# Patient Record
Sex: FEMALE | Race: WHITE | Employment: UNEMPLOYED | URBAN - METROPOLITAN AREA
[De-identification: names, ages, dates, MRNs, and addresses within clinical notes are randomized per-mention and may not be internally consistent; named-entity substitution may affect disease eponyms.]

---

## 2022-04-22 ENCOUNTER — TELEPHONE (OUTPATIENT)
Dept: OTOLARYNGOLOGY | Facility: CLINIC | Age: 77
End: 2022-04-22

## 2022-04-22 ENCOUNTER — OFFICE VISIT (OUTPATIENT)
Dept: OTOLARYNGOLOGY | Facility: CLINIC | Age: 77
End: 2022-04-22
Payer: MEDICARE

## 2022-04-22 VITALS — HEIGHT: 60 IN | BODY MASS INDEX: 31.41 KG/M2 | WEIGHT: 160 LBS

## 2022-04-22 DIAGNOSIS — K14.6 BURNING MOUTH SYNDROME: Primary | ICD-10-CM

## 2022-04-22 RX ORDER — CLOTRIMAZOLE 10 MG/1
LOZENGE ORAL; TOPICAL
COMMUNITY
Start: 2021-09-14

## 2022-04-22 RX ORDER — MELOXICAM 7.5 MG/1
TABLET ORAL
COMMUNITY
Start: 2022-04-03

## 2022-04-22 RX ORDER — PREDNISONE 10 MG/1
TABLET ORAL
Qty: 44 TABLET | Refills: 0 | Status: SHIPPED | OUTPATIENT
Start: 2022-04-22

## 2022-04-22 RX ORDER — DIPHENHYDRAMINE HCL 12.5MG/5ML
LIQUID (ML) ORAL
Qty: 500 ML | Refills: 0 | Status: SHIPPED | OUTPATIENT
Start: 2022-04-22

## 2022-04-23 NOTE — TELEPHONE ENCOUNTER
Rn spoke to Gauri Villa and order clarified for Hydrocortisone 20 mg mix in Benadryl /elixir. Ezequiel Beth

## 2022-05-20 ENCOUNTER — OFFICE VISIT (OUTPATIENT)
Dept: OTOLARYNGOLOGY | Facility: CLINIC | Age: 77
End: 2022-05-20
Payer: MEDICARE

## 2022-05-20 VITALS — WEIGHT: 160 LBS | BODY MASS INDEX: 31.41 KG/M2 | HEIGHT: 60 IN

## 2022-05-20 DIAGNOSIS — K14.6 BURNING MOUTH SYNDROME: Primary | ICD-10-CM

## 2022-05-20 RX ORDER — HYDROCORTISONE 20 MG/1
20 TABLET ORAL DAILY
Qty: 8 TABLET | Refills: 0 | Status: SHIPPED | OUTPATIENT
Start: 2022-05-20

## 2022-05-23 ENCOUNTER — TELEPHONE (OUTPATIENT)
Dept: OTOLARYNGOLOGY | Facility: CLINIC | Age: 77
End: 2022-05-23

## 2022-05-23 NOTE — TELEPHONE ENCOUNTER
Pt states was supposed to take the following medication  diphenhydrAMINE 12.5 MG/5ML Oral Elixir 500 mL       not this medication hydrocortisone 20 MG Oral asking what is the correct medication please advise

## 2023-08-15 ENCOUNTER — TELEPHONE (OUTPATIENT)
Dept: OTOLARYNGOLOGY | Facility: CLINIC | Age: 78
End: 2023-08-15

## 2023-08-15 RX ORDER — CLOTRIMAZOLE 10 MG/1
LOZENGE ORAL; TOPICAL
Qty: 150 TROCHE | Refills: 0 | Status: SHIPPED | OUTPATIENT
Start: 2023-08-15

## 2023-08-15 NOTE — TELEPHONE ENCOUNTER
Refill was sent today    clotrimazole 10 MG Mouth/Throat Ruthy 150 Ruthy 0 8/15/2023     Sig: clotrimazole 10 mg ruthy Take 1 tablet(s) 5 times a day by oral route.     Sent to pharmacy as: Clotrimazole 10 MG Mouth/Throat Ruthy (Mycelex)    E-Prescribing Status: Receipt confirmed by pharmacy (8/15/2023 12:02 PM CDT)

## 2023-08-15 NOTE — TELEPHONE ENCOUNTER
clotrimazole 10 MG Mouth/Throat Ruthy, clotrimazole 10 mg ruthy  Take 1 tablet(s) 5 times a day by oral route.  (Patient not taking: No sig reported), Disp: , Rfl:    Pt is requesting a refill and spoke with dr Hadley Solis about this medication

## 2023-09-21 ENCOUNTER — TELEPHONE (OUTPATIENT)
Dept: OTOLARYNGOLOGY | Facility: CLINIC | Age: 78
End: 2023-09-21

## 2023-09-21 RX ORDER — HYDROCORTISONE 20 MG/1
20 TABLET ORAL DAILY
Qty: 8 TABLET | Refills: 2 | Status: SHIPPED | OUTPATIENT
Start: 2023-09-21

## 2024-04-18 ENCOUNTER — OFFICE VISIT (OUTPATIENT)
Dept: OTOLARYNGOLOGY | Facility: CLINIC | Age: 79
End: 2024-04-18

## 2024-04-18 DIAGNOSIS — R68.84 JAW PAIN: Primary | ICD-10-CM

## 2024-04-18 PROCEDURE — 99214 OFFICE O/P EST MOD 30 MIN: CPT | Performed by: OTOLARYNGOLOGY

## 2024-04-18 RX ORDER — CYCLOBENZAPRINE HCL 5 MG
5 TABLET ORAL NIGHTLY
Qty: 30 TABLET | Refills: 1 | Status: SHIPPED | OUTPATIENT
Start: 2024-04-18

## 2024-04-18 NOTE — PROGRESS NOTES
Pema Ruvalcaba is a 79 year old female.    Chief Complaint   Patient presents with    Follow - Up     Patient is here due to discomfort in mouth  follow up. Reports improvement in symptoms.        HISTORY OF PRESENT ILLNESS  She presents with 3-year history of gum sores.  Seen by her dentist with multiple lesions noted throughout her gingiva and buccal mucosa.  She states about a year ago she started developing burning tongue syndrome.  No help from anything so far.  She has been on B vitamins without any improvement in her symptoms.  Seen by dentist and given no medications referred to me for further evaluation and treatment.  States that when she eats certain types of food it hurts badly and seems to be worse when she moves her tongue in certain directions.  Seems like there is a mechanical cause of her pain in most situations.  She is able to drink water and soup without discomfort.  Despite every other food causes discomfort.     5/20/22 last visit she was started on prednisone burst and taper as well as Benadryl/hydrocortisone elixir and she is a complete resolution of her 3-year history of mouth ulcers and her 1 year history of burning tongue.  No complaints or concerns very happy with the results of her treatment     4/18/24 she presents with pain in the mouth along the upper teeth going into the jaw and behind the ear.  Feels discomfort.  Was diagnosed with TMJ in the past offered a bite guard that she could only use for a short period of time many years ago.  Brushing her teeth with electric toothbrush causes her discomfort.  Certain foods when she chews to much causes her discomfort as well.  Has a history of osteoarthritis.  Has some cervical spine issues as well.  Currently on meloxicam daily but has been told by her regular doctor to wean herself off of this but has not been able to.  Too much pain if she does not use it.      Social History     Socioeconomic History    Marital status:    Tobacco  Use    Smoking status: Former     Current packs/day: 0.00     Types: Cigarettes     Quit date: 1980     Years since quittin.3    Smokeless tobacco: Never   Vaping Use    Vaping status: Never Used   Substance and Sexual Activity    Alcohol use: Yes     Alcohol/week: 1.0 standard drink of alcohol     Types: 1 Glasses of wine per week     Comment: nightly    Drug use: Never       History reviewed. No pertinent family history.    History reviewed. No pertinent past medical history.    History reviewed. No pertinent surgical history.      REVIEW OF SYSTEMS    System Neg/Pos Details   Constitutional Negative Fatigue, fever and weight loss.   ENMT Negative Drooling.   Eyes Negative Blurred vision and vision changes.   Respiratory Negative Dyspnea and wheezing.   Cardio Negative Chest pain, irregular heartbeat/palpitations and syncope.   GI Negative Abdominal pain and diarrhea.   Endocrine Negative Cold intolerance and heat intolerance.   Neuro Negative Tremors.   Psych Negative Anxiety and depression.   Integumentary Negative Frequent skin infections, pigment change and rash.   Hema/Lymph Negative Easy bleeding and easy bruising.           PHYSICAL EXAM    There were no vitals taken for this visit.       Constitutional Normal Overall appearance - Normal.   Psychiatric Normal Orientation - Oriented to time, place, person & situation. Appropriate mood and affect.   Neck Exam Normal Inspection - Normal. Palpation - Normal. Parotid gland - Normal. Thyroid gland - Normal.   Eyes Normal Conjunctiva - Right: Normal, Left: Normal. Pupil - Right: Normal, Left: Normal. Fundus - Right: Normal, Left: Normal.   Neurological Normal Memory - Normal. Cranial nerves - Cranial nerves II through XII grossly intact.   Head/Face Normal Facial features - Normal. Eyebrows - Normal. Skull - Normal.        Nasopharynx Normal External nose - Normal. Lips/teeth/gums - Normal. Tonsils - Normal. Oropharynx - Normal.   Ears Normal Inspection -  Right: Normal, Left: Normal. Canal - Right: Normal, Left: Normal. TM - Right: Normal, Left: Normal.   Skin Normal Inspection - Normal.        Lymph Detail Normal Submental. Submandibular. Anterior cervical. Posterior cervical. Supraclavicular.   TMJ  Tender to palpation bilaterally   Nose/Mouth/Throat Normal External nose - Normal. Lips/teeth/gums - Normal. Tonsils - Normal. Oropharynx - Normal.   Nose/Mouth/Throat Normal Nares - Right: Normal Left: Normal. Septum -Normal  Turbinates - Right: Normal, Left: Normal.       Current Outpatient Medications:     cyclobenzaprine 5 MG Oral Tab, Take 1 tablet (5 mg total) by mouth nightly., Disp: 30 tablet, Rfl: 1    hydrocortisone 20 MG Oral Tab, Take 1 tablet (20 mg total) by mouth daily., Disp: 8 tablet, Rfl: 2    clotrimazole 10 MG Mouth/Throat Ruthy, clotrimazole 10 mg ruthy Take 1 tablet(s) 5 times a day by oral route., Disp: 150 Ruthy, Rfl: 0    hydrocortisone 20 MG Oral Tab, Take 1 tablet (20 mg total) by mouth daily., Disp: 8 tablet, Rfl: 0    Cranberry-Vitamin C-Vitamin E 4200-20-3 MG-MG-UNIT Oral Cap, cranberry, Disp: , Rfl:     Meloxicam 7.5 MG Oral Tab, , Disp: , Rfl:     diclofenac 1 % External Gel, Apply topically As Directed., Disp: , Rfl:     predniSONE 10 MG Oral Tab, 6 tablets daily day one through 5, 4 tablets daily on days  6 and 7, 2 tablets daily on days 8 and 9, One tablet daily on days 10 and 11., Disp: 44 tablet, Rfl: 0    diphenhydrAMINE 12.5 MG/5ML Oral Elixir, Please crush 8 tablets of hydrocortisone 20 mg and mix in 500 cc of Benadryl elixir. 5 cc swish and swallow q.6 hours, Disp: 500 mL, Rfl: 0  ASSESSMENT AND PLAN    1. Jaw pain  Appears to be musculoskeletal.  Doing very well with her burning mouth issues.  Start cyclobenzaprine continue with meloxicam warm heat soft diet use Voltaren gel externally along the jaw and return to see me in 1 month.  She may consider talking to her dentist about getting a bite guard.  Greater than 30 minutes  spent with patient discussions regarding her multiple issues.        This note was prepared using Dragon [employer] voice recognition dictation software. As a result errors may occur. When identified these errors have been corrected. While every attempt is made to correct errors during dictation discrepancies may still exist    Filipe Chavez MD    4/18/2024    11:40 AM

## 2024-07-19 ENCOUNTER — OFFICE VISIT (OUTPATIENT)
Dept: OTOLARYNGOLOGY | Facility: CLINIC | Age: 79
End: 2024-07-19

## 2024-07-19 VITALS — BODY MASS INDEX: 31.85 KG/M2 | WEIGHT: 158 LBS | HEIGHT: 59 IN

## 2024-07-19 DIAGNOSIS — K13.79 MOUTH PAIN: Primary | ICD-10-CM

## 2024-07-19 PROCEDURE — 99213 OFFICE O/P EST LOW 20 MIN: CPT | Performed by: OTOLARYNGOLOGY

## 2024-07-19 RX ORDER — PREDNISONE 10 MG/1
TABLET ORAL
Qty: 44 TABLET | Refills: 0 | Status: SHIPPED | OUTPATIENT
Start: 2024-07-19

## 2024-07-19 RX ORDER — ZOSTER VACCINE RECOMBINANT, ADJUVANTED 50 MCG/0.5
KIT INTRAMUSCULAR
COMMUNITY
Start: 2023-12-04

## 2024-07-19 RX ORDER — FAMOTIDINE 20 MG/1
1 TABLET, FILM COATED ORAL AS NEEDED
COMMUNITY

## 2024-07-19 RX ORDER — CETIRIZINE HYDROCHLORIDE 10 MG/1
10 TABLET ORAL DAILY
COMMUNITY

## 2024-07-19 RX ORDER — HYDROCORTISONE 20 MG/1
TABLET ORAL
Qty: 8 TABLET | Refills: 0 | Status: SHIPPED | OUTPATIENT
Start: 2024-07-19

## 2024-07-19 RX ORDER — MELATONIN
1000 DAILY
COMMUNITY

## 2024-07-19 RX ORDER — VITAMIN E 268 MG
400 CAPSULE ORAL DAILY
COMMUNITY

## 2024-07-19 RX ORDER — CYCLOSPORINE 0.5 MG/ML
1 EMULSION OPHTHALMIC EVERY 12 HOURS
COMMUNITY
Start: 2024-03-27

## 2024-07-19 RX ORDER — DIPHENHYDRAMINE HCL 12.5MG/5ML
LIQUID (ML) ORAL
Qty: 500 ML | Refills: 0 | Status: SHIPPED | OUTPATIENT
Start: 2024-07-19

## 2024-07-20 NOTE — PROGRESS NOTES
Pema Ruvalcaba is a 79 year old female.    Chief Complaint   Patient presents with    Burning Tongue     Patient reports burning tongue sensation.       HISTORY OF PRESENT ILLNESS  She presents with 3-year history of gum sores.  Seen by her dentist with multiple lesions noted throughout her gingiva and buccal mucosa.  She states about a year ago she started developing burning tongue syndrome.  No help from anything so far.  She has been on B vitamins without any improvement in her symptoms.  Seen by dentist and given no medications referred to me for further evaluation and treatment.  States that when she eats certain types of food it hurts badly and seems to be worse when she moves her tongue in certain directions.  Seems like there is a mechanical cause of her pain in most situations.  She is able to drink water and soup without discomfort.  Despite every other food causes discomfort.     5/20/22 last visit she was started on prednisone burst and taper as well as Benadryl/hydrocortisone elixir and she is a complete resolution of her 3-year history of mouth ulcers and her 1 year history of burning tongue.  No complaints or concerns very happy with the results of her treatment     4/18/24 she presents with pain in the mouth along the upper teeth going into the jaw and behind the ear.  Feels discomfort.  Was diagnosed with TMJ in the past offered a bite guard that she could only use for a short period of time many years ago.  Brushing her teeth with electric toothbrush causes her discomfort.  Certain foods when she chews to much causes her discomfort as well.  Has a history of osteoarthritis.  Has some cervical spine issues as well.  Currently on meloxicam daily but has been told by her regular doctor to wean herself off of this but has not been able to.  Too much pain if she does not use it.     7/19/24 She has been having oral pain and tongue discomfort with oral ulcers once again for the past few weeks. Pain was  unbearable last week. Could not even eat.  Had this earlier this year and it resolved with steroids.  Etiology unclear.       Social History     Socioeconomic History    Marital status:    Tobacco Use    Smoking status: Former     Current packs/day: 0.00     Types: Cigarettes     Quit date: 1980     Years since quittin.5    Smokeless tobacco: Never   Vaping Use    Vaping status: Never Used   Substance and Sexual Activity    Alcohol use: Yes     Alcohol/week: 1.0 standard drink of alcohol     Types: 1 Glasses of wine per week     Comment: nightly    Drug use: Never       History reviewed. No pertinent family history.    History reviewed. No pertinent past medical history.    History reviewed. No pertinent surgical history.      REVIEW OF SYSTEMS    System Neg/Pos Details   Constitutional Negative Fatigue, fever and weight loss.   ENMT Negative Drooling.   Eyes Negative Blurred vision and vision changes.   Respiratory Negative Dyspnea and wheezing.   Cardio Negative Chest pain, irregular heartbeat/palpitations and syncope.   GI Negative Abdominal pain and diarrhea.   Endocrine Negative Cold intolerance and heat intolerance.   Neuro Negative Tremors.   Psych Negative Anxiety and depression.   Integumentary Negative Frequent skin infections, pigment change and rash.   Hema/Lymph Negative Easy bleeding and easy bruising.           PHYSICAL EXAM    Ht 4' 11\" (1.499 m)   Wt 158 lb (71.7 kg)   BMI 31.91 kg/m²        Constitutional Normal Overall appearance - Normal.   Psychiatric Normal Orientation - Oriented to time, place, person & situation. Appropriate mood and affect.   Neck Exam Normal Inspection - Normal. Palpation - Normal. Parotid gland - Normal. Thyroid gland - Normal.   Eyes Normal Conjunctiva - Right: Normal, Left: Normal. Pupil - Right: Normal, Left: Normal. Fundus - Right: Normal, Left: Normal.   Neurological Normal Memory - Normal. Cranial nerves - Cranial nerves II through XII grossly  intact.   Head/Face Normal Facial features - Normal. Eyebrows - Normal. Skull - Normal.        Nasopharynx Normal External nose - Normal. Lips/teeth/gums - Normal. Tonsils - Normal. Oropharynx - Normal.   Ears Normal Inspection - Right: Normal, Left: Normal. Canal - Right: Normal, Left: Normal. TM - Right: Normal, Left: Normal.   Skin Normal Inspection - Normal.        Lymph Detail Normal Submental. Submandibular. Anterior cervical. Posterior cervical. Supraclavicular.        Nose/Mouth/Throat Normal External nose - Normal. Lips/teeth/gums - Normal. Tonsils - Normal. Oropharynx - Normal. No tongue lesions. Left buccal mucosal excoriation.    Nose/Mouth/Throat Normal Nares - Right: Normal Left: Normal. Septum -Normal  Turbinates - Right: Normal, Left: Normal.       Current Outpatient Medications:     cetirizine 10 MG Oral Tab, Take 1 tablet (10 mg total) by mouth daily., Disp: , Rfl:     cholecalciferol 125 MCG (5000 UT) Oral Tab, Take 1 tablet (125 mcg total) by mouth daily., Disp: , Rfl:     cyanocobalamin 1000 MCG Oral Tab, Take 1 tablet (1,000 mcg total) by mouth daily., Disp: , Rfl:     cycloSPORINE 0.05 % Ophthalmic Emulsion, Place 1 drop into both eyes Q12H., Disp: , Rfl:     famotidine 20 MG Oral Tab, Take 1 tablet (20 mg total) by mouth as needed., Disp: , Rfl:     Vitamin E (VITAMIN E/D-ALPHA NATURAL) 268 MG (400 UNIT) Oral Cap, Take 400 Units by mouth daily., Disp: , Rfl:     Zoster Vac Recomb Adjuvanted (SHINGRIX) 50 MCG/0.5ML Intramuscular Recon Susp, Inject 0.5 mLs into the muscle once for 1 dose. Repeat in 2 to 6 months., Disp: , Rfl:     predniSONE 10 MG Oral Tab, 6 tablets daily day one through 5, 4 tablets daily on days  6 and 7, 2 tablets daily on days 8 and 9, One tablet daily on days 10 and 11., Disp: 44 tablet, Rfl: 0    diphenhydrAMINE 12.5 MG/5ML Oral Elixir, Please crush 8 tablets of hydrocortisone 20 mg and mix in 500 cc of Benadryl elixir. 5 cc swish and swallow q.6 hours, Disp: 500 mL,  Rfl: 0    hydrocortisone 20 MG Oral Tab, Please crush 8 tablets of hydrocortisone 20 mg and mix in 500 cc of Benadryl elixir. 5 cc swish and swallow q.6 hours, Disp: 8 tablet, Rfl: 0    hydrocortisone 20 MG Oral Tab, Take 1 tablet (20 mg total) by mouth daily., Disp: 8 tablet, Rfl: 2    clotrimazole 10 MG Mouth/Throat Ruthy, clotrimazole 10 mg ruthy Take 1 tablet(s) 5 times a day by oral route., Disp: 150 Ruthy, Rfl: 0    hydrocortisone 20 MG Oral Tab, Take 1 tablet (20 mg total) by mouth daily., Disp: 8 tablet, Rfl: 0    Cranberry-Vitamin C-Vitamin E 4200-20-3 MG-MG-UNIT Oral Cap, cranberry, Disp: , Rfl:     Meloxicam 7.5 MG Oral Tab, , Disp: , Rfl:     diclofenac 1 % External Gel, Apply topically As Directed., Disp: , Rfl:     diphenhydrAMINE 12.5 MG/5ML Oral Elixir, Please crush 8 tablets of hydrocortisone 20 mg and mix in 500 cc of Benadryl elixir. 5 cc swish and swallow q.6 hours, Disp: 500 mL, Rfl: 0    cyclobenzaprine 5 MG Oral Tab, Take 1 tablet (5 mg total) by mouth nightly. (Patient not taking: Reported on 7/19/2024), Disp: 30 tablet, Rfl: 1    predniSONE 10 MG Oral Tab, 6 tablets daily day one through 5, 4 tablets daily on days  6 and 7, 2 tablets daily on days 8 and 9, One tablet daily on days 10 and 11. (Patient not taking: Reported on 7/19/2024), Disp: 44 tablet, Rfl: 0  ASSESSMENT AND PLAN    1. Mouth pain  Once again with oral cavity and tongue pain. Etiology unclear.  Resume mouthwash containing Hydrocortisone and Benadryl as well as prednisone burst and taper.         This note was prepared using Dragon [employer] voice recognition dictation software. As a result errors may occur. When identified these errors have been corrected. While every attempt is made to correct errors during dictation discrepancies may still exist    Filipe Chavez MD    7/19/2024    9:48 PM

## 2024-07-23 ENCOUNTER — TELEPHONE (OUTPATIENT)
Dept: OTOLARYNGOLOGY | Facility: CLINIC | Age: 79
End: 2024-07-23

## 2024-07-23 NOTE — TELEPHONE ENCOUNTER
Third party reject information  Medication not covered by insurance [place] required for   DIPHENHYDRAMINE HCI 12.5 ML elxphar take 5 ml and swish and swallow every 6 hours  Quant-500.0  Days supply 25

## 2024-08-16 ENCOUNTER — OFFICE VISIT (OUTPATIENT)
Dept: AUDIOLOGY | Facility: CLINIC | Age: 79
End: 2024-08-16

## 2024-08-16 ENCOUNTER — OFFICE VISIT (OUTPATIENT)
Dept: OTOLARYNGOLOGY | Facility: CLINIC | Age: 79
End: 2024-08-16

## 2024-08-16 DIAGNOSIS — H91.90 HEARING LOSS, UNSPECIFIED HEARING LOSS TYPE, UNSPECIFIED LATERALITY: Primary | ICD-10-CM

## 2024-08-16 DIAGNOSIS — H91.91 DECREASED HEARING OF RIGHT EAR: Primary | ICD-10-CM

## 2024-08-16 DIAGNOSIS — K14.6 TONGUE PAIN: ICD-10-CM

## 2024-08-16 DIAGNOSIS — H90.3 SENSORINEURAL HEARING LOSS, BILATERAL: ICD-10-CM

## 2024-08-16 PROCEDURE — 92557 COMPREHENSIVE HEARING TEST: CPT | Performed by: AUDIOLOGIST

## 2024-08-16 PROCEDURE — 92567 TYMPANOMETRY: CPT | Performed by: AUDIOLOGIST

## 2024-08-16 PROCEDURE — 99213 OFFICE O/P EST LOW 20 MIN: CPT | Performed by: OTOLARYNGOLOGY

## 2024-08-16 RX ORDER — GABAPENTIN 100 MG/1
100 CAPSULE ORAL 3 TIMES DAILY
Qty: 90 CAPSULE | Refills: 1 | Status: SHIPPED | OUTPATIENT
Start: 2024-08-16

## 2024-08-16 NOTE — PROGRESS NOTES
Pema Ruvalcaba is a 79 year old female.    Chief Complaint   Patient presents with    Hearing Loss     Patient is here due to not being able to hear well from left ear.     Mouth Injury     Patient is here due to burning sensation on tongue        HISTORY OF PRESENT ILLNESS  She presents with 3-year history of gum sores.  Seen by her dentist with multiple lesions noted throughout her gingiva and buccal mucosa.  She states about a year ago she started developing burning tongue syndrome.  No help from anything so far.  She has been on B vitamins without any improvement in her symptoms.  Seen by dentist and given no medications referred to me for further evaluation and treatment.  States that when she eats certain types of food it hurts badly and seems to be worse when she moves her tongue in certain directions.  Seems like there is a mechanical cause of her pain in most situations.  She is able to drink water and soup without discomfort.  Despite every other food causes discomfort.     5/20/22 last visit she was started on prednisone burst and taper as well as Benadryl/hydrocortisone elixir and she is a complete resolution of her 3-year history of mouth ulcers and her 1 year history of burning tongue.  No complaints or concerns very happy with the results of her treatment     4/18/24 she presents with pain in the mouth along the upper teeth going into the jaw and behind the ear.  Feels discomfort.  Was diagnosed with TMJ in the past offered a bite guard that she could only use for a short period of time many years ago.  Brushing her teeth with electric toothbrush causes her discomfort.  Certain foods when she chews to much causes her discomfort as well.  Has a history of osteoarthritis.  Has some cervical spine issues as well.  Currently on meloxicam daily but has been told by her regular doctor to wean herself off of this but has not been able to.  Too much pain if she does not use it.     7/19/24 She has been having  oral pain and tongue discomfort with oral ulcers once again for the past few weeks. Pain was unbearable last week. Could not even eat.  Had this earlier this year and it resolved with steroids.  Etiology unclear.      24 last visit she developed sudden onset of illness with significant cough.  Within 2 weeks she started noticing the onset of some changes in her hearing on the left side.  Was given some eardrops but now notes an echoing sensation since the beginning of August.  No other signs, symptoms or complaints.  Feels that her ear has slightly improved.  Still continues to have tongue discomfort but interestingly only seem to happen with tongue movement by talking or chewing food.  The food itself does not seem to bother her as much of the movement.  Neuralgia?  Never took the prednisone or hydrocortisone with Benadryl that was prescribed at her last visit.  Audiogram was performed today demonstrating significant mild downsloping to severe sensory hearing loss bilaterally with an asymmetry on the left with a speech discrimination scores of about 60% on the left and 100% on the right.      Social History     Socioeconomic History    Marital status:    Tobacco Use    Smoking status: Former     Current packs/day: 0.00     Types: Cigarettes     Quit date: 1980     Years since quittin.6    Smokeless tobacco: Never   Vaping Use    Vaping status: Never Used   Substance and Sexual Activity    Alcohol use: Yes     Alcohol/week: 1.0 standard drink of alcohol     Types: 1 Glasses of wine per week     Comment: nightly    Drug use: Never       History reviewed. No pertinent family history.    History reviewed. No pertinent past medical history.    History reviewed. No pertinent surgical history.      REVIEW OF SYSTEMS    System Neg/Pos Details   Constitutional Negative Fatigue, fever and weight loss.   ENMT Negative Drooling.   Eyes Negative Blurred vision and vision changes.   Respiratory Negative  Dyspnea and wheezing.   Cardio Negative Chest pain, irregular heartbeat/palpitations and syncope.   GI Negative Abdominal pain and diarrhea.   Endocrine Negative Cold intolerance and heat intolerance.   Neuro Negative Tremors.   Psych Negative Anxiety and depression.   Integumentary Negative Frequent skin infections, pigment change and rash.   Hema/Lymph Negative Easy bleeding and easy bruising.           PHYSICAL EXAM    There were no vitals taken for this visit.       Constitutional Normal Overall appearance - Normal.   Psychiatric Normal Orientation - Oriented to time, place, person & situation. Appropriate mood and affect.   Neck Exam Normal Inspection - Normal. Palpation - Normal. Parotid gland - Normal. Thyroid gland - Normal.   Eyes Normal Conjunctiva - Right: Normal, Left: Normal. Pupil - Right: Normal, Left: Normal. Fundus - Right: Normal, Left: Normal.   Neurological Normal Memory - Normal. Cranial nerves - Cranial nerves II through XII grossly intact.   Head/Face Normal Facial features - Normal. Eyebrows - Normal. Skull - Normal.        Nasopharynx Normal External nose - Normal. Lips/teeth/gums - Normal. Tonsils - Normal. Oropharynx - Normal.   Ears Normal Inspection - Right: Normal, Left: Normal. Canal - Right: Normal, Left: Normal. TM - Right: Normal, Left: Normal.   Skin Normal Inspection - Normal.        Lymph Detail Normal Submental. Submandibular. Anterior cervical. Posterior cervical. Supraclavicular.        Nose/Mouth/Throat Normal External nose - Normal. Lips/teeth/gums - Normal. Tonsils - Normal. Oropharynx - Normal.   Nose/Mouth/Throat Normal Nares - Right: Normal Left: Normal. Septum -Normal  Turbinates - Right: Normal, Left: Normal.       Current Outpatient Medications:     gabapentin 100 MG Oral Cap, Take 1 capsule (100 mg total) by mouth 3 (three) times daily., Disp: 90 capsule, Rfl: 1    cetirizine 10 MG Oral Tab, Take 1 tablet (10 mg total) by mouth daily., Disp: , Rfl:      cholecalciferol 125 MCG (5000 UT) Oral Tab, Take 1 tablet (125 mcg total) by mouth daily., Disp: , Rfl:     cyanocobalamin 1000 MCG Oral Tab, Take 1 tablet (1,000 mcg total) by mouth daily., Disp: , Rfl:     cycloSPORINE 0.05 % Ophthalmic Emulsion, Place 1 drop into both eyes Q12H., Disp: , Rfl:     famotidine 20 MG Oral Tab, Take 1 tablet (20 mg total) by mouth as needed., Disp: , Rfl:     Vitamin E (VITAMIN E/D-ALPHA NATURAL) 268 MG (400 UNIT) Oral Cap, Take 400 Units by mouth daily., Disp: , Rfl:     Zoster Vac Recomb Adjuvanted (SHINGRIX) 50 MCG/0.5ML Intramuscular Recon Susp, Inject 0.5 mLs into the muscle once for 1 dose. Repeat in 2 to 6 months., Disp: , Rfl:     predniSONE 10 MG Oral Tab, 6 tablets daily day one through 5, 4 tablets daily on days  6 and 7, 2 tablets daily on days 8 and 9, One tablet daily on days 10 and 11., Disp: 44 tablet, Rfl: 0    diphenhydrAMINE 12.5 MG/5ML Oral Elixir, Please crush 8 tablets of hydrocortisone 20 mg and mix in 500 cc of Benadryl elixir. 5 cc swish and swallow q.6 hours, Disp: 500 mL, Rfl: 0    hydrocortisone 20 MG Oral Tab, Please crush 8 tablets of hydrocortisone 20 mg and mix in 500 cc of Benadryl elixir. 5 cc swish and swallow q.6 hours, Disp: 8 tablet, Rfl: 0    cyclobenzaprine 5 MG Oral Tab, Take 1 tablet (5 mg total) by mouth nightly., Disp: 30 tablet, Rfl: 1    hydrocortisone 20 MG Oral Tab, Take 1 tablet (20 mg total) by mouth daily., Disp: 8 tablet, Rfl: 2    clotrimazole 10 MG Mouth/Throat Ruthy, clotrimazole 10 mg ruthy Take 1 tablet(s) 5 times a day by oral route., Disp: 150 Ruthy, Rfl: 0    hydrocortisone 20 MG Oral Tab, Take 1 tablet (20 mg total) by mouth daily., Disp: 8 tablet, Rfl: 0    Cranberry-Vitamin C-Vitamin E 4200-20-3 MG-MG-UNIT Oral Cap, cranberry, Disp: , Rfl:     Meloxicam 7.5 MG Oral Tab, , Disp: , Rfl:     diclofenac 1 % External Gel, Apply topically As Directed., Disp: , Rfl:     predniSONE 10 MG Oral Tab, 6 tablets daily day one  through 5, 4 tablets daily on days  6 and 7, 2 tablets daily on days 8 and 9, One tablet daily on days 10 and 11., Disp: 44 tablet, Rfl: 0    diphenhydrAMINE 12.5 MG/5ML Oral Elixir, Please crush 8 tablets of hydrocortisone 20 mg and mix in 500 cc of Benadryl elixir. 5 cc swish and swallow q.6 hours, Disp: 500 mL, Rfl: 0  ASSESSMENT AND PLAN    1. Hearing loss, unspecified hearing loss type, unspecified laterality    - Audiology Referral - [facility])    2. Tongue pain  Interestingly tongue pain occurs more with movement and with food and liquid touching the tongue.  Neuralgia?  I do recommend gabapentin 100 mg p.o. 3 times daily to start with in addition she does have some prednisone burst and taper meds that she did not start after her last visit and her audiogram does demonstrate an asymmetry in hearing on the left side as well as speech discrimination scores.  I did recommend that she return to see me in 2 weeks with repeat audiogram.        This note was prepared using Dragon [employer] voice recognition dictation software. As a result errors may occur. When identified these errors have been corrected. While every attempt is made to correct errors during dictation discrepancies may still exist    Filipe Chavez MD    8/16/2024    4:04 PM

## 2024-09-13 ENCOUNTER — TELEPHONE (OUTPATIENT)
Dept: OTOLARYNGOLOGY | Facility: CLINIC | Age: 79
End: 2024-09-13

## 2024-09-13 NOTE — TELEPHONE ENCOUNTER
Symptoms started 1 week ago,   Pt does not feel that room is spinning, no nausea  States when getting up to walk , she feels \" uneasy\" , imbalance   Per pt feels it may be from Gabapentin asking if she should stop medication or wean off?

## 2024-09-13 NOTE — TELEPHONE ENCOUNTER
Per patient states she has been experiencing dizziness while taking gabapentin, patient asking if she should continue taking medication or wean off it. States she took last dose at 10pm last night. Please call thank you.

## 2024-09-24 ENCOUNTER — OFFICE VISIT (OUTPATIENT)
Dept: AUDIOLOGY | Facility: CLINIC | Age: 79
End: 2024-09-24

## 2024-09-24 ENCOUNTER — OFFICE VISIT (OUTPATIENT)
Dept: OTOLARYNGOLOGY | Facility: CLINIC | Age: 79
End: 2024-09-24

## 2024-09-24 DIAGNOSIS — H91.90 HEARING LOSS, UNSPECIFIED HEARING LOSS TYPE, UNSPECIFIED LATERALITY: Primary | ICD-10-CM

## 2024-09-24 DIAGNOSIS — H90.3 ASYMMETRIC SNHL (SENSORINEURAL HEARING LOSS): Primary | ICD-10-CM

## 2024-09-24 DIAGNOSIS — H69.92 DYSFUNCTION OF LEFT EUSTACHIAN TUBE: ICD-10-CM

## 2024-09-24 PROCEDURE — 92556 SPEECH AUDIOMETRY COMPLETE: CPT | Performed by: AUDIOLOGIST

## 2024-09-24 PROCEDURE — 99214 OFFICE O/P EST MOD 30 MIN: CPT | Performed by: OTOLARYNGOLOGY

## 2024-09-24 PROCEDURE — 92567 TYMPANOMETRY: CPT | Performed by: AUDIOLOGIST

## 2024-09-24 PROCEDURE — 92552 PURE TONE AUDIOMETRY AIR: CPT | Performed by: AUDIOLOGIST

## 2024-09-25 NOTE — PROGRESS NOTES
Pema Ruvalcaba is a 79 year old female.    Chief Complaint   Patient presents with    Follow - Up     Patient is here for follow up of 1 months hearing loss.       HISTORY OF PRESENT ILLNESS  She presents with 3-year history of gum sores.  Seen by her dentist with multiple lesions noted throughout her gingiva and buccal mucosa.  She states about a year ago she started developing burning tongue syndrome.  No help from anything so far.  She has been on B vitamins without any improvement in her symptoms.  Seen by dentist and given no medications referred to me for further evaluation and treatment.  States that when she eats certain types of food it hurts badly and seems to be worse when she moves her tongue in certain directions.  Seems like there is a mechanical cause of her pain in most situations.  She is able to drink water and soup without discomfort.  Despite every other food causes discomfort.     5/20/22 last visit she was started on prednisone burst and taper as well as Benadryl/hydrocortisone elixir and she is a complete resolution of her 3-year history of mouth ulcers and her 1 year history of burning tongue.  No complaints or concerns very happy with the results of her treatment     4/18/24 she presents with pain in the mouth along the upper teeth going into the jaw and behind the ear.  Feels discomfort.  Was diagnosed with TMJ in the past offered a bite guard that she could only use for a short period of time many years ago.  Brushing her teeth with electric toothbrush causes her discomfort.  Certain foods when she chews to much causes her discomfort as well.  Has a history of osteoarthritis.  Has some cervical spine issues as well.  Currently on meloxicam daily but has been told by her regular doctor to wean herself off of this but has not been able to.  Too much pain if she does not use it.     7/19/24 She has been having oral pain and tongue discomfort with oral ulcers once again for the past few weeks.  Pain was unbearable last week. Could not even eat.  Had this earlier this year and it resolved with steroids.  Etiology unclear.      8/16/24 last visit she developed sudden onset of illness with significant cough.  Within 2 weeks she started noticing the onset of some changes in her hearing on the left side.  Was given some eardrops but now notes an echoing sensation since the beginning of August.  No other signs, symptoms or complaints.  Feels that her ear has slightly improved.  Still continues to have tongue discomfort but interestingly only seem to happen with tongue movement by talking or chewing food.  The food itself does not seem to bother her as much of the movement.  Neuralgia?  Never took the prednisone or hydrocortisone with Benadryl that was prescribed at her last visit.  Audiogram was performed today demonstrating significant mild downsloping to severe sensory hearing loss bilaterally with an asymmetry on the left with a speech discrimination scores of about 60% on the left and 100% on the right.     9/24/24 overall doing fairly well having tolerable discomfort in her mouth.  Has oral lesions which respond to use of Benadryl/hydrocortisone elixir.  In the past her burning tongue symptoms have resolved with the use of prednisone.  Has many questions specifically does she have a lichen planus.  Lesions intraorally seem to shift to different areas of the buccal mucosa and gingival mucosa but never on her tongue surface.  No other significant signs, symptoms or complaints.  She did have a repeat audiogram demonstrating unchanged asymmetric sensory hearing loss with the left being slightly worse than the right.  Both sides demonstrate a mild downsloping to severe sensorineural hearing loss.  Speech discrimination scores of 84% on the right 52% on the left.  Suspected of having a sudden onset sensorineural hearing loss recently finished off a prednisone burst and taper.      Social History     Socioeconomic  History    Marital status:    Tobacco Use    Smoking status: Former     Current packs/day: 0.00     Types: Cigarettes     Quit date: 1980     Years since quittin.7    Smokeless tobacco: Never   Vaping Use    Vaping status: Never Used   Substance and Sexual Activity    Alcohol use: Yes     Alcohol/week: 1.0 standard drink of alcohol     Types: 1 Glasses of wine per week     Comment: nightly    Drug use: Never       History reviewed. No pertinent family history.    History reviewed. No pertinent past medical history.    History reviewed. No pertinent surgical history.      REVIEW OF SYSTEMS    System Neg/Pos Details   Constitutional Negative Fatigue, fever and weight loss.   ENMT Negative Drooling.   Eyes Negative Blurred vision and vision changes.   Respiratory Negative Dyspnea and wheezing.   Cardio Negative Chest pain, irregular heartbeat/palpitations and syncope.   GI Negative Abdominal pain and diarrhea.   Endocrine Negative Cold intolerance and heat intolerance.   Neuro Negative Tremors.   Psych Negative Anxiety and depression.   Integumentary Negative Frequent skin infections, pigment change and rash.   Hema/Lymph Negative Easy bleeding and easy bruising.           PHYSICAL EXAM    There were no vitals taken for this visit.       Constitutional Normal Overall appearance - Normal.   Psychiatric Normal Orientation - Oriented to time, place, person & situation. Appropriate mood and affect.   Neck Exam Normal Inspection - Normal. Palpation - Normal. Parotid gland - Normal. Thyroid gland - Normal.   Eyes Normal Conjunctiva - Right: Normal, Left: Normal. Pupil - Right: Normal, Left: Normal. Fundus - Right: Normal, Left: Normal.   Neurological Normal Memory - Normal. Cranial nerves - Cranial nerves II through XII grossly intact.   Head/Face Normal Facial features - Normal. Eyebrows - Normal. Skull - Normal.        Nasopharynx Normal External nose - Normal. Lips/teeth/gums - Normal. Tonsils - Normal.  Oropharynx - Normal.   Ears Normal Inspection - Right: Normal, Left: Normal. Canal - Right: Normal, Left: Normal. TM - Right: Normal, Left: Normal.   Skin Normal Inspection - Normal.        Lymph Detail Normal Submental. Submandibular. Anterior cervical. Posterior cervical. Supraclavicular.        Nose/Mouth/Throat Normal External nose - Normal. Lips/teeth/gums - Normal. Tonsils - Normal. Oropharynx - Normal.  Lesion on the gingivobuccal sulcus on the left.  About a centimeter in size   Nose/Mouth/Throat Normal Nares - Right: Normal Left: Normal. Septum -Normal  Turbinates - Right: Normal, Left: Normal.       Current Outpatient Medications:     gabapentin 100 MG Oral Cap, Take 1 capsule (100 mg total) by mouth 3 (three) times daily., Disp: 90 capsule, Rfl: 1    cetirizine 10 MG Oral Tab, Take 1 tablet (10 mg total) by mouth daily., Disp: , Rfl:     cholecalciferol 125 MCG (5000 UT) Oral Tab, Take 1 tablet (125 mcg total) by mouth daily., Disp: , Rfl:     cyanocobalamin 1000 MCG Oral Tab, Take 1 tablet (1,000 mcg total) by mouth daily., Disp: , Rfl:     cycloSPORINE 0.05 % Ophthalmic Emulsion, Place 1 drop into both eyes Q12H., Disp: , Rfl:     famotidine 20 MG Oral Tab, Take 1 tablet (20 mg total) by mouth as needed., Disp: , Rfl:     Vitamin E (VITAMIN E/D-ALPHA NATURAL) 268 MG (400 UNIT) Oral Cap, Take 400 Units by mouth daily., Disp: , Rfl:     Zoster Vac Recomb Adjuvanted (SHINGRIX) 50 MCG/0.5ML Intramuscular Recon Susp, Inject 0.5 mLs into the muscle once for 1 dose. Repeat in 2 to 6 months., Disp: , Rfl:     predniSONE 10 MG Oral Tab, 6 tablets daily day one through 5, 4 tablets daily on days  6 and 7, 2 tablets daily on days 8 and 9, One tablet daily on days 10 and 11., Disp: 44 tablet, Rfl: 0    diphenhydrAMINE 12.5 MG/5ML Oral Elixir, Please crush 8 tablets of hydrocortisone 20 mg and mix in 500 cc of Benadryl elixir. 5 cc swish and swallow q.6 hours, Disp: 500 mL, Rfl: 0    hydrocortisone 20 MG Oral Tab,  Please crush 8 tablets of hydrocortisone 20 mg and mix in 500 cc of Benadryl elixir. 5 cc swish and swallow q.6 hours, Disp: 8 tablet, Rfl: 0    cyclobenzaprine 5 MG Oral Tab, Take 1 tablet (5 mg total) by mouth nightly., Disp: 30 tablet, Rfl: 1    hydrocortisone 20 MG Oral Tab, Take 1 tablet (20 mg total) by mouth daily., Disp: 8 tablet, Rfl: 2    clotrimazole 10 MG Mouth/Throat Ruthy, clotrimazole 10 mg ruthy Take 1 tablet(s) 5 times a day by oral route., Disp: 150 Ruthy, Rfl: 0    hydrocortisone 20 MG Oral Tab, Take 1 tablet (20 mg total) by mouth daily., Disp: 8 tablet, Rfl: 0    Cranberry-Vitamin C-Vitamin E 4200-20-3 MG-MG-UNIT Oral Cap, cranberry, Disp: , Rfl:     Meloxicam 7.5 MG Oral Tab, , Disp: , Rfl:     diclofenac 1 % External Gel, Apply topically As Directed., Disp: , Rfl:     predniSONE 10 MG Oral Tab, 6 tablets daily day one through 5, 4 tablets daily on days  6 and 7, 2 tablets daily on days 8 and 9, One tablet daily on days 10 and 11., Disp: 44 tablet, Rfl: 0    diphenhydrAMINE 12.5 MG/5ML Oral Elixir, Please crush 8 tablets of hydrocortisone 20 mg and mix in 500 cc of Benadryl elixir. 5 cc swish and swallow q.6 hours, Disp: 500 mL, Rfl: 0  ASSESSMENT AND PLAN    1. Hearing loss, unspecified hearing loss type, unspecified laterality  I did recommend repeating a hearing test in about 6 months to watch for any interval change in her asymmetric hearing loss.  I did also recommend she consider looking into amplification with hearing aids which she states that she use some hearing aids many years ago and they really bothered her as they could not be adjusted properly.  With respect to her mouth issues I did recommend continued use of hydrocortisone/Benadryl elixir as this does seem to help her.  We also discussed the fact that her tongue does not ever seem to have any  lesions and that she may have burning tongue syndrome which currently seems to be under reasonably good control.  She will return to  see me on appearing basis for any further issues.  30 minutes in discussion with patient regarding her multiple issues.  - Audiology Referral - [facility])        This note was prepared using Dragon [employer] voice recognition dictation software. As a result errors may occur. When identified these errors have been corrected. While every attempt is made to correct errors during dictation discrepancies may still exist    Filipe Chavez MD    9/24/2024    7:10 PM

## 2025-04-18 NOTE — TELEPHONE ENCOUNTER
This refill request is being sent to the provider for the following reason:  []Patient has not had an appointment within the past 12 months but has made an appointment on: ___  [x]Medication is not within protocol  []Patient did not complete follow up recommendations  []Other: ___    Dr. Chavez, please review order and sign if agreeable.  Patient had mouth sores and burning tongue return.

## 2025-04-19 RX ORDER — DIPHENHYDRAMINE HCL 12.5MG/5ML
LIQUID (ML) ORAL
Qty: 500 ML | Refills: 0 | Status: SHIPPED | OUTPATIENT
Start: 2025-04-19

## 2025-04-19 RX ORDER — HYDROCORTISONE 20 MG/1
TABLET ORAL
Qty: 8 TABLET | Refills: 0 | Status: SHIPPED | OUTPATIENT
Start: 2025-04-19